# Patient Record
Sex: MALE | Race: BLACK OR AFRICAN AMERICAN | Employment: FULL TIME | URBAN - METROPOLITAN AREA
[De-identification: names, ages, dates, MRNs, and addresses within clinical notes are randomized per-mention and may not be internally consistent; named-entity substitution may affect disease eponyms.]

---

## 2023-09-12 ENCOUNTER — OFFICE VISIT (OUTPATIENT)
Age: 53
End: 2023-09-12

## 2023-09-12 VITALS — HEIGHT: 73 IN | BODY MASS INDEX: 27.17 KG/M2 | WEIGHT: 205 LBS

## 2023-09-12 DIAGNOSIS — M25.562 PAIN IN BOTH KNEES, UNSPECIFIED CHRONICITY: Primary | ICD-10-CM

## 2023-09-12 DIAGNOSIS — M25.561 PAIN IN BOTH KNEES, UNSPECIFIED CHRONICITY: Primary | ICD-10-CM

## 2023-09-12 RX ORDER — LIDOCAINE HYDROCHLORIDE 10 MG/ML
2 INJECTION, SOLUTION INFILTRATION; PERINEURAL ONCE
Status: COMPLETED | OUTPATIENT
Start: 2023-09-12 | End: 2023-09-12

## 2023-09-12 RX ORDER — METHYLPREDNISOLONE ACETATE 80 MG/ML
80 INJECTION, SUSPENSION INTRA-ARTICULAR; INTRALESIONAL; INTRAMUSCULAR; SOFT TISSUE ONCE
Status: COMPLETED | OUTPATIENT
Start: 2023-09-12 | End: 2023-09-12

## 2023-09-12 RX ADMIN — METHYLPREDNISOLONE ACETATE 80 MG: 80 INJECTION, SUSPENSION INTRA-ARTICULAR; INTRALESIONAL; INTRAMUSCULAR; SOFT TISSUE at 15:16

## 2023-09-12 RX ADMIN — LIDOCAINE HYDROCHLORIDE 2 ML: 10 INJECTION, SOLUTION INFILTRATION; PERINEURAL at 15:15

## 2023-09-12 NOTE — PROGRESS NOTES
[place]  [facility] [id number] [facility]  [address] [place] [address] [id number]  [place] [address]  Dept: [phone]  Dept Fax: [phone]    Chief Compliant:  Chief Complaint   Patient presents with    Knee Pain     Bilateral knees. History of Present Illness: This is a 47 y.o. male who presents to the clinic today for evaluation / follow up of bilateral knee pain. He states he is still able to exercise and he will jog about a mile however he gets stiffness in the knees and with squatting motions will get some discomfort over the lateral joint lines. He has had previous right knee arthroscopy for meniscal debridement. He states otherwise the knees do not bother him walking on flat ground or majority throughout the day. He has no mechanical catching or grabbing. No effusions. Physical Exam:    On physical exam there is no effusion of the knees he really does not have much tenderness to palpation today he has full knee range of motion with flexion and extension no varus or valgus instability cap of less than 2 seconds sensation tact light touch good quadricep strength. Nursing note and vitals reviewed. Labs and Imaging:     XR taken today:  XR KNEE BILATERAL STANDARD (3 VIEWS)    Result Date: 9/12/2023  3 views of bilateral knees show overall well-preserved joint space with neutral alignment just some mild medial joint space narrowing no significant osteophytes or bony lesions. No acute process           Orders Placed This Encounter   Procedures    XR KNEE BILATERAL STANDARD (3 VIEWS)     Order Specific Question:   Reason for exam:     Answer:   Bilateral Knee Pain    HI DRAIN/INJECT LARGE JOINT/BURSA    HI DRAIN/INJECT LARGE JOINT/BURSA       Assessment and Plan:  1. Pain in both knees, unspecified chronicity          This is a 47 y.o. male with bilateral knee mild arthritis.   I think he